# Patient Record
Sex: MALE | Race: WHITE
[De-identification: names, ages, dates, MRNs, and addresses within clinical notes are randomized per-mention and may not be internally consistent; named-entity substitution may affect disease eponyms.]

---

## 2018-09-12 ENCOUNTER — HOSPITAL ENCOUNTER (OUTPATIENT)
Dept: HOSPITAL 25 - CHICATH | Age: 68
Discharge: HOME | End: 2018-09-12
Attending: INTERNAL MEDICINE
Payer: MEDICARE

## 2018-09-12 VITALS — DIASTOLIC BLOOD PRESSURE: 67 MMHG | SYSTOLIC BLOOD PRESSURE: 110 MMHG

## 2018-09-12 DIAGNOSIS — R00.1: ICD-10-CM

## 2018-09-12 DIAGNOSIS — I25.89: ICD-10-CM

## 2018-09-12 DIAGNOSIS — R53.83: ICD-10-CM

## 2018-09-12 DIAGNOSIS — Z95.1: ICD-10-CM

## 2018-09-12 DIAGNOSIS — I25.119: Primary | ICD-10-CM

## 2018-09-12 PROCEDURE — C1887 CATHETER, GUIDING: HCPCS

## 2018-09-12 PROCEDURE — 93459 L HRT ART/GRFT ANGIO: CPT

## 2018-09-13 NOTE — CATH
CC:  Dr. Jordan Mccauley; Dr. Werner Dickerson *

 

CARDIAC CATHETERIZATION REPORT:

 

DATE OF CATHETERIZATION:  09/12/18 - [facility] CATH

 

INDICATION FOR THE PROCEDURE:  Asked by Dr. Werner Dickerson, his primary cardiologist
, performed a cardiac catheterization in light of the patient having abnormal 
nuclear exercise stress test showing a large area of anterior and anterolateral 
ischemia and EKG changes.

 

The procedure is coronary arteriography, left heart catheterization, left 
ventriculography.

 

The patient was interviewed and examined in the holding area where the risks 
and benefits were explained.  He understood them and wished to proceed.

 

PRECARDIAC CATHETERIZATION LABORATORY RESULTS:  Hemoglobin and hematocrit of 
15.5 and 46 with a platelet count of 180,000.  A BUN and creatinine of 13 and 
1.2. Sodium 137, potassium 4.8, chloride 103, bicarb 29.

 

EQUIPMENT UTILIZED:

1.  Right femoral artery sheath:  A 5-Liberian, 11-cm Sheryl sheath.

2.  Diagnostic coronary arteriography catheters:  A 5-Liberian FL4 curved and FR4 
curved diagnostic catheter.

3.  Catheter for vein graft arteriography to the left-sided posterior 
descending artery: a 5-Liberian multipurpose-1 catheter.

4.  Diagnostic catheter for LIMA graft arteriography:  5-Liberian IM catheter.

5.  Left heart catheterization catheter:  A 5-Liberian 145 degree angled pigtail 
catheter.

 

MEDICATIONS GIVEN DURING THE PROCEDURE:  1 mg of Versed intravenously.

 

DESCRIPTION OF PROCEDURE:  

   The patient was brought to the cardiovascular laboratory where a formal time-
out was performed.  The patient was prepped and draped in sterile fashion.  The 
right groin area was anesthetized with 1% lidocaine, right femoral artery was 
cannulated, and sheath was placed.  Diagnostic coronary arteriography was 
performed followed by vein graft arteriography and LIMA graft arteriography.  
Central aortic pressure was recorded using an angled pigtail catheter advanced 
to the ascending aorta.  The catheter was then passed across the aortic valve 
into the left ventricle where left ventricular pressure was recorded. Left 
ventriculography was performed utilizing 24 cc of Omnipaque dye at a rate of 12 
cc per second.  The catheter was then pulled back across the aortic valve to 
recheck gradient.  At the end of the case, an injection was made into the right 
femoral sheath to assess the eligibility to utilize the closure device.  It was 
decided that manual pressure would be utilized.

 

The total contrast used was 125 cc of Omnipaque dye.  The radiation exposure 
included 9.8 minutes of fluoro time.  The air kerma radiation was 1707 mGy.  
The DAP radiation was 10,849 mcGy per sq m.

 

RESULTS:

 

HEMODYNAMIC DATA:  

   Left heart catheterization - Revealed central aortic pressure of 160/81with 
a mean of 111.  Left ventricular pressure 161 over left ventricular end- 
diastolic pressure of 17 to 20 mmHg.

 

LEFT VENTRICULOGRAPHY:

   Performed in the GAN projection revealed symmetric contraction of the left 
ventricle.  Some PVCs were seen.  Ejection fraction was estimated at 60%.  
There were no focal wall motion abnormalities noted.

 

CORONARY ARTERIOGRAPHY: 

   A.  Left coronary artery:

      1.  Left main - Widely patent.

      2.  Left anterior descending artery - The left anterior descending artery 
had a narrowing in its proximal to ostial area, which appeared in its worst 
view to be 55 to 60%.  The mid portion of the left anterior descending artery 
tapered somewhat with luminal reduction of 50% matching the continued size of 
the lumen throughout the mid to distal portion, competitive flow was seen to 
the distal LAD from the LIMA graft.  The first diagonal branch was a very thin 
thread like vessel, small in caliber with total occlusion in its mid portion 
with retrograde filling.  The second diagonal branch was equally thin in nature 
with a significant 80% ostial narrowing.  The vessel was somewhat long in nature
, but clearly of a caliber not amenable to any type of percutaneous 
intervention.

      3.  Circumflex artery - A dominant vessel supplying a high thin first 
obtuse marginal branch followed by a moderate-sized second mid obtuse marginal 
branch.  Just past this branch there is a narrowing of 35% followed by a mid 
narrowing of 25 to 30%.  There was a subtotally occluded thread like obtuse 
marginal branch noted with retrograde filling from the second obtuse marginal 
branch to this third obtuse marginal branch.  The continuation supplied several 
other very thin posterior left ventricular branches and ended in left-sided 
PDA.  The left-sided PDA was totally occluded in its proximal portion.

   B. Right coronary artery - nondominant , with a 75-80% lesion seen in its 
midportion before the last 3 acute marginal branches. The vessel is small in 
caliber at this point.





FUCHS GRAFT ARTERIOGRAPHY TO LEFT ANTERIOR DESCENDING ARTERY

    - Widely patent with good anastomosis.  The artery attached to a somewhat 
small caliber mid-to- distal LAD.  Retrograde filling was seen back 
approximately with the proximal disease as described earlier in the LAD.

 

VEIN GRAFT ARTERIOGRAPHY TO LEFT SIDED POSTERIOR DESCENDING ARTERY

    - Widely patent with good anastomosis with collateral blood flow seen to 
supply most likely collateral flow to either a diagonal branch or a higher up 
obtuse marginal branch.

 

OVERALL ASSESSMENT:  

   Patent left internal mammary artery graft and vein graft as described above 
with diffuse disease involving small caliber diagonal branches and small 
caliber obtuse marginal branches.  No focal critical stenosis was noted in a 
bypass graft or in an artery necessitating any type of stent placement. Normal 
LV systolic function with no wall motion abnormalities. Consideration for 
maximizing medical management with nitrates or amlodipine to try to dilate 
collateral blood flow would probably be most appropriate.  This information was 
shared with Dr. Werner Dickerson, the patient's primary cardiologist.

 

 [id number]/[facility] #: [id number]

LEIGH

## 2019-02-01 NOTE — HP
CC:  Dr. Jordan Mccauley; Dr. Werner Dickerson; Guanakito Mancini MD

 

ADMITTING HISTORY AND PHYSICAL:

 

DATE OF ADMISSION:  02/04/19

 

ADMITTING DIAGNOSIS:  Probable calculus, right ureterovesical junction.

 

PLANNED PROCEDURE:  Right ureteroscopy, possible laser and stent insertion.

 

SURGEON:  Dr. Mancini.

 

HISTORY OF PRESENT ILLNESS:  Hiren Tuttle is a 68-year-old gentleman who has had episodic discomfort
 in the urethra for the last 6 to 8 weeks.  He said every few days, he gets an episode of some pain a
nd discomfort in the urethra, often associated with mild frequency and then symptoms subsides spontan
eously.  He denies any flank pain, dysuria, or gross hematuria and there is no history of urolithiasi
s.  An initial renal sonogram done in my office had revealed no evidence of hydronephrosis and he was
 brought in for a flexible cystoscopy.  Flexible cystoscopy revealed significant inflammatory respons
e surrounding the right ureteral orifice, which to me is highly suggestive of a calculus impacted in 
the right distal ureter close to the ureterovesical junction.  A repeat ultrasound was performed, whi
ch seemed to show a calculus in the vicinity of the ureterovesical junction, although it is not confi
rmatory; and because of the fact that the symptoms have been going on for 6 to 8 weeks and the clearl
y abnormal findings on cystoscopy, the presumption is that he does have a calculus in the ureterovesi
ness junction and is now being brought in for further evaluation and management.  I have explained to 
him that the other possible cause of the finding on cystoscopy could be superficial neoplasm surround
ing the orifice of the right ureter within the bladder.

 

PAST MEDICAL HISTORY:  Significant for:

1.  Coronary artery disease.

2.  High cholesterol.

3.  Back pain.

 

PAST SURGICAL HISTORY:  Significant for coronary artery bypass graft in 2013, bilateral total knee re
placement, and right total hip replacement. MEDICATIONS ON ADMISSION:

1.  Norvasc 2.5 mg daily.

2.  Zetia 10 mg daily.

3.  Lipitor 80 mg daily.

4.  Metoprolol 12.5 mg twice a day.

5.  Aspirin 81 mg a day.

6.  Viagra p.r.n.

7.  Gabapentin 300 mg daily.

8.  Bupropion 75 mg daily.

 

ALLERGIES:  No known drug allergies.

 

FAMILY HISTORY:  There is no family history of kidney stone.

 

SOCIAL HISTORY:  Smoking history:  He is a nonsmoker.

 

REVIEW OF SYSTEMS:  He is physically active.  He denies any chest pain or shortness of breath.  There
 is no history of diabetes mellitus or any other major systemic illness.

 

                               PHYSICAL EXAMINATION

 

GENERAL:  Reveals a pleasant middle age gentleman.

 

VITAL SIGNS:  Blood pressure is 136/82, pulse 53 per minute and regular, temperature 96.8, oxygen sat
uration 96% on room air.

 

LUNGS:  Clear bilaterally

 

CARDIOVASCULAR EXAM:  Regular rate and rhythm.  S1, S2.

 

ABDOMEN:  Soft without masses.

 

 IMPRESSION:  A 68-year-old gentleman with a fairly atypical presentation, who has above mentioned fi
ndings noted on cystoscopy.  Usually these are associated with an impacted calculus in the right uret
erovesical junction.  He had a recent extensive cardiac workup including nuclear stress test and subs
equent catheterization and transthoracic echocardiogram.  There is ejection fraction about 50% to 55%
 with functionally benign heart valve and mildly dilated ascending aorta.

 

PLAN:  Right ureteroscopy, possible laser, and stent insertion.

 

 [id number]/[facility] #: [id number]
same name as above

## 2019-02-04 ENCOUNTER — HOSPITAL ENCOUNTER (OUTPATIENT)
Dept: HOSPITAL 25 - OR | Age: 69
Discharge: HOME | End: 2019-02-04
Attending: UROLOGY
Payer: MEDICARE

## 2019-02-04 VITALS — DIASTOLIC BLOOD PRESSURE: 96 MMHG | SYSTOLIC BLOOD PRESSURE: 149 MMHG

## 2019-02-04 DIAGNOSIS — I25.10: ICD-10-CM

## 2019-02-04 DIAGNOSIS — I10: ICD-10-CM

## 2019-02-04 DIAGNOSIS — N20.1: Primary | ICD-10-CM

## 2019-02-04 DIAGNOSIS — E78.00: ICD-10-CM

## 2019-02-04 DIAGNOSIS — M54.9: ICD-10-CM

## 2019-02-04 DIAGNOSIS — Z95.1: ICD-10-CM

## 2019-02-04 PROCEDURE — 74420 UROGRAPHY RTRGR +-KUB: CPT

## 2019-02-04 PROCEDURE — 88305 TISSUE EXAM BY PATHOLOGIST: CPT

## 2019-02-04 PROCEDURE — C1876 STENT, NON-COA/NON-COV W/DEL: HCPCS

## 2019-02-04 NOTE — OP
CC:  Dr. Mccauley *

 

DATE OF OPERATION:  02/04/19 - [facility]

 

DATE OF BIRTH:  [birth year]

 

SURGEON:  Guanakito Mancini M.D.

 

ANESTHESIOLOGIST:  Dr. Plummer.

 

ANESTHESIA:  General.

 

PRE-OP DIAGNOSIS:  Abnormal findings on cystoscopy noted in bladder (likely 
secondary to a calculus in distal ureter).

 

POST-OP DIAGNOSIS:  Abnormal findings, right side of urinary bladder (no 
evidence of calculus seen in ureter, possibly passed calculus).

 

OPERATIVE PROCEDURE:  Cystoscopy, right retrograde pyelogram, right ureteroscopy
, and stent insertion, and bladder biopsies and fulguration.

 

COMPLICATIONS:  None.

 

STENT USED:  7-Vatican citizen stent, right ureter.

 

OPERATIVE FINDINGS:

1.  Normal-appearing urethra.

2.  Mildly enlarged prostate.

3.  Area of edema and inflammation surrounding right orifice within urinary 
bladder with no evidence of calculus seen on ureteroscopy.

 

INDICATIONS:  Hiren Tuttle is a 68-year-old gentleman, who has had episodic 
symptoms of urethral discomfort.  Flexible cystoscopy in my office had revealed 
significant edema and inflammation surrounding the right orifice.  No definite 
calculus had been visualized on earlier sonography and a repeat ultrasound 
showed possibly suggestion of a small calculus, but was not confirmed.  He is 
now being brought in for further evaluation and management.

 

POSTOPERATIVE CONDITION:  Stable.

 

DESCRIPTION OF PROCEDURE:  After induction of general anesthesia, the patient 
was placed in dorsal lithotomy position.  Sequential compression devices were 
in place and functioning.  Initial evaluation revealed a normal-appearing 
urethra, mildly enlarged prostate, the bladder was examined. Surrounding the 
right orifice, there was a cuff of hyperemic raised irregular mucosa.  This had 
the appearance of an inflammatory response as usually seen with an impacted 
calculus.  The remainder of the bladder was unremarkable.

 

A guidewire was introduced into the right orifice.  Retrograde pyelogram did 
not reveal any persistent filling defects or obstruction.  A 6-Vatican citizen semi-
rigid ureteroscope was introduced and advanced under direct vision.  The entire 
distal mid and part of the proximal right ureter were carefully visualized and 
I did not see any definite calculus.  I did not see anything to suggest site of 
impaction of a calculus in the distal ureter and the ureteroscope was carefully 
withdrawn under direct vision.  A 7-Vatican citizen stent was positioned under 
fluoroscopy with good proximal and distal positioning obtained.

 

Using a biopsy forceps, excision biopsies were carried out of the abnormal 
appearing mucosa and the surrounding area.  Once this was done, Bugbee cautery 
was used to achieve hemostasis and a 18-Vatican citizen Alford was placed for temporary 
bladder drainage.  The patient tolerated the procedure satisfactorily and was 
transferred back to the recovery area in stable condition.

 

 [id number]/[facility] #: [id number]

MTDD